# Patient Record
Sex: FEMALE | Race: WHITE | Employment: UNEMPLOYED | URBAN - METROPOLITAN AREA
[De-identification: names, ages, dates, MRNs, and addresses within clinical notes are randomized per-mention and may not be internally consistent; named-entity substitution may affect disease eponyms.]

---

## 2018-12-18 ENCOUNTER — HOSPITAL ENCOUNTER (OUTPATIENT)
Dept: PHYSICAL THERAPY | Age: 38
Discharge: HOME | End: 2018-12-18
Payer: COMMERCIAL

## 2018-12-18 PROCEDURE — 97161 PT EVAL LOW COMPLEX 20 MIN: CPT

## 2018-12-18 NOTE — THERAPY EVALUATION
Elba Leo  : 1980  Primary: Perry Gallegos [facility]  Secondary:  [address] at [facility] ROXANNE FRIED  1101 [facility], [place], [address] [place] [address],[address], [facility] [address].  [phone]   [phone]          OUTPATIENT PHYSICAL THERAPY:Initial Assessment 2018   ICD-10: Treatment Diagnosis: Pain in left knee (M25.562)    Precautions/Allergies: allergic to PCN and sulfa      MD Orders: Eval and treat, ROM, strengthening, HEP 2x week for 6 weeks. MEDICAL/REFERRING DIAGNOSIS:  Left Knee    DATE OF ONSET: about 2 years ago  REFERRING PHYSICIAN: Yandel Cody MD  RETURN PHYSICIAN APPOINTMENT: 19     INITIAL ASSESSMENT:  Ms. Duncan Thornton is a 43year old female with complaints of L knee pain over the past couple of years. She presents with pain during some activities, decreased flexibility in quads and hip flexors, and radiographic evidence of arthritis in her L knee. She could benefit from PT to address deficits and work toward goals. PROBLEM LIST (Impacting functional limitations):  1. Decreased ADL/Functional Activities  2. Increased Pain  3. Decreased Flexibility/Joint Mobility INTERVENTIONS PLANNED: (Treatment may consist of any combination of the following)  1. Home Exercise Program (HEP)  2. Manual Therapy  3. Therapeutic Exercise/Strengthening  4. aquatic PT   5. Modals as needed   TREATMENT PLAN:  Effective Dates: 2018 TO 3/18/2019 (90 days). Frequency/Duration: 2 times a week for 90 Day(s)  GOALS: (Goals have been discussed and agreed upon with patient.)  Patient's stated goal is to be able to exercise again  Short-Term Functional Goals: Time Frame: 6 weeks  1. Patient to dinesh independent with HEP  2. Patient to tolerate 45 minute aquatic PT sessions  3. Patient to report no pain in L knee greater than 6/10 for 3 days in a row. Discharge Goals: Time Frame: 90 days  1. Patient to report no more than minimal L knee pain with usual daily activities  2.  Patient to report being able to resume walking for exercising  3. Patient to improve LEFS score to 60 to demo improved functional mobility  Rehabilitation Potential For Stated Goals: [facility], I certify that the treatment plan above will be carried out by a therapist or under their direction. Thank you for this referral,  Arturo Dominguez PT                  The information in this section was collected on 12/18/18 (except where otherwise noted). HISTORY:   History of Present Injury/Illness (Reason for Referral):  *patient reports that about 2 years ago, she hyper extended her L knee while putting a child in his car seat. She rested and iced off and on and the knee seemed to get better at times, but would get worse again. She finally went to family MD and was referred to Dr. Neha Medrano. An MRI showed \"An [de-identified]year old knee\" and she was told that surgery was not an option. She reports that the knee often gómez underneath her. Past Medical History/Comorbidities: Concussion, stress fracture, ulcerative colitis. Social History/Living Environment:      Lives in 2 [place] home with family. Her daughter shadows Dr. Neha Medrano. Prior Level of Function/Work/Activity:  Middle . Likes to exercise to lose weight but knee pain prevents it. Ambulatory/Rehab Services H2 Model Falls Risk Assessment    Risk Factors:       No Risk Factors Identified Ability to Rise from Chair:       (0)  Ability to rise in a single movement    Falls Prevention Plan:       No modifications necessary   Total: (5 or greater = High Risk): 0    ©2010 [facility] [employer]. All Rights Reserved. [facility] States Patent #[id number].  Federal Law prohibits the replication, distribution or use without written permission from [employer]     Current Medications:  Lialda, Omega 3, probiotic   Date Last Reviewed:  12/18/18   Number of Personal Factors/Comorbidities that affect the Plan of Care: 0: LOW COMPLEXITY   EXAMINATION: Observation/Orthostatic Postural Assessment:          Moderately overweight female in no acute distress. B genuvalgum. Girth measurements at superior an inferior patellar poles R 43.5/35 cm,  L 41.5/33.5 cm  Palpation:          No pain with palpation  ROM:          AROM R knee flex 130, extn 1        AROM L knee flex 128, extn 3        Tight in B hip flexors and quads  Strength:          Grossly 5/5 in B LEs via MMT,   Special Tests:          Positive McMurrays on L LE. Ambiguous grind test on B LEs  Neurological Screen:        Sensation: light touch intact in B LEs   Body Structures Involved:  1. Joints  2. Muscles Body Functions Affected:  1. Neuromusculoskeletal  2. Movement Related Activities and Participation Affected:  1. General Tasks and Demands  2. Mobility  3. Community, Social and Heth [place]   Number of elements (examined above) that affect the Plan of Care: 1-2: LOW COMPLEXITY   CLINICAL PRESENTATION:   Presentation: Evolving clinical presentation with changing clinical characteristics: MODERATE COMPLEXITY   CLINICAL DECISION MAKING:   Outcome Measure: Tool Used: Lower Extremity Functional Scale (LEFS)  Score:  Initial: 44/80 Most Recent: X/80 (Date: -- )   Interpretation of Score: 20 questions each scored on a 5 point scale with 0 representing \"extreme difficulty or unable to perform\" and 4 representing \"no difficulty\". The lower the score, the greater the functional disability. 80/80 represents no disability. Minimal detectable change is 9 points. Medical Necessity:   · Patient demonstrates fair rehab potential due to higher previous functional level. Reason for Services/Other Comments:  · Patient continues to require skilled intervention due to need to return to higher level of function.    Use of outcome tool(s) and clinical judgement create a POC that gives a: Questionable prediction of patient's progress: MODERATE COMPLEXITY            TREATMENT:   (In addition to Assessment/Re-Assessment sessions the following treatments were rendered)  Pre-treatment Symptoms/Complaints:  Pain with some movements, knee locking up or buckling underneath her. Pain: Initial:   Pain Intensity 1: 0(None sitting, but up to 8 at times)   Post Session:  Slightly increased pain reported, but not quantified. Therapeutic Exercise: ( ):  5 min - Exercises to improve mobility and strength. Required moderate visual and verbal cues to ensure correct performance. Progressed complexity of movement as indicated. Instructed patient in hip flexor and quad stretch from Olive Hippo test position. Showed her the pool and lock facilities. Treatment/Session Assessment:    · Response to Treatment:  Patient seemed to understand importance of stretching. Will plan to start in pool and progress to land exercises as able. · Compliance with Program/Exercises: Will assess as treatment progresses. · Recommendations/Intent for next treatment session: \"Next visit will focus on beginning aquatic PT. \".   Total Treatment Duration:  50 minutes  PT Patient Time In/Time Out  Time In: 1630  Time Out: [address] [facility] Kavon Torres

## 2018-12-20 ENCOUNTER — HOSPITAL ENCOUNTER (OUTPATIENT)
Dept: PHYSICAL THERAPY | Age: 38
Discharge: HOME | End: 2018-12-20
Payer: COMMERCIAL

## 2018-12-20 PROCEDURE — 97113 AQUATIC THERAPY/EXERCISES: CPT

## 2018-12-20 NOTE — PROGRESS NOTES
Abeba Leo  : 1980  Primary: Roxanne Ramos  Secondary:  [address]  at [facility] ROXANNE FRIED  1101 [facility], [address] [place] [address] [address], [facility] [address]. 91.  [phone]   [phone]          OUTPATIENT PHYSICAL THERAPY:Daily Note and Aquatic Note 2018   ICD-10: Treatment Diagnosis: Pain in left knee (M25.562)    Precautions/Allergies: allergic to PCN and sulfa      MD Orders: Eval and treat, ROM, strengthening, HEP 2x week for 6 weeks. MEDICAL/REFERRING DIAGNOSIS:  L knee    DATE OF ONSET: about 2 years ago  REFERRING PHYSICIAN: Elsa Lockwood MD  RETURN PHYSICIAN APPOINTMENT: 19     INITIAL ASSESSMENT:  Ms. Esperanza Vazquez is a 43year old female with complaints of L knee pain over the past couple of years. She presents with pain during some activities, decreased flexibility in quads and hip flexors, and radiographic evidence of arthritis in her L knee. She could benefit from PT to address deficits and work toward goals. PROBLEM LIST (Impacting functional limitations):  1. Decreased ADL/Functional Activities  2. Increased Pain  3. Decreased Flexibility/Joint Mobility INTERVENTIONS PLANNED: (Treatment may consist of any combination of the following)  1. Home Exercise Program (HEP)  2. Manual Therapy  3. Therapeutic Exercise/Strengthening  4. aquatic PT   5. Modals as needed   TREATMENT PLAN:  Effective Dates: 2018 TO 3/18/2019 (90 days). Frequency/Duration: 2 times a week for 90 Day(s)  GOALS: (Goals have been discussed and agreed upon with patient.)  Patient's stated goal is to be able to exercise again  Short-Term Functional Goals: Time Frame: 6 weeks  1. Patient to dinesh independent with HEP  2. Patient to tolerate 45 minute aquatic PT sessions  3. Patient to report no pain in L knee greater than 6/10 for 3 days in a row. Discharge Goals: Time Frame: 90 days  1. Patient to report no more than minimal L knee pain with usual daily activities  2.  Patient to report being able to resume walking for exercising  3. Patient to improve LEFS score to 60 to demo improved functional mobility  Rehabilitation Potential For Stated Goals: Fair                    The information in this section was collected on 12/18/18 (except where otherwise noted). HISTORY:   History of Present Injury/Illness (Reason for Referral):  *patient reports that about 2 years ago, she hyper extended her L knee while putting a child in his car seat. She rested and iced off and on and the knee seemed to get better at times, but would get worse again. She finally went to family MD and was referred to Dr. Saqib Parker. An MRI showed \"An [de-identified]year old knee\" and she was told that surgery was not an option. She reports that the knee often gómez underneath her. Past Medical History/Comorbidities: Concussion, stress fracture, ulcerative colitis. Social History/Living Environment:      Lives in 2 story home with family. Her daughter shadows Dr. Saqib Parker. Prior Level of Function/Work/Activity:  Middle . Likes to exercise to lose weight but knee pain prevents it. Ambulatory/Rehab Services H2 Model Falls Risk Assessment    Risk Factors:       No Risk Factors Identified Ability to Rise from Chair:       (0)  Ability to rise in a single movement    Falls Prevention Plan:       No modifications necessary   Total: (5 or greater = High Risk): 0    ©2010 [facility] [employer]. All Rights Reserved. [facility] States Patent #[id number]. Federal Law prohibits the replication, distribution or use without written permission from [facility] [employer]     Current Medications:  Lialda, Omega 3, probiotic   Date Last Reviewed:  12/18/18   Number of Personal Factors/Comorbidities that affect the Plan of Care: 0: LOW COMPLEXITY   EXAMINATION:   Observation/Orthostatic Postural Assessment:          Moderately overweight female in no acute distress. B genuvalgum.   Girth measurements at superior an inferior patellar poles R 43.5/35 cm,  L 41.5/33.5 cm  Palpation:          No pain with palpation  ROM:          AROM R knee flex 130, extn 1        AROM L knee flex 128, extn 3        Tight in B hip flexors and quads  Strength:          Grossly 5/5 in B LEs via MMT,   Special Tests:          Positive McMurrays on L LE. Ambiguous grind test on B LEs  Neurological Screen:        Sensation: light touch intact in B LEs   Body Structures Involved:  1. Joints  2. Muscles Body Functions Affected:  1. Neuromusculoskeletal  2. Movement Related Activities and Participation Affected:  1. General Tasks and Demands  2. Mobility  3. Community, Social and Kevin Trade   Number of elements (examined above) that affect the Plan of Care: 1-2: LOW COMPLEXITY   CLINICAL PRESENTATION:   Presentation: Evolving clinical presentation with changing clinical characteristics: MODERATE COMPLEXITY   CLINICAL DECISION MAKING:   Outcome Measure: Tool Used: Lower Extremity Functional Scale (LEFS)  Score:  Initial: 44/80 Most Recent: X/80 (Date: -- )   Interpretation of Score: 20 questions each scored on a 5 point scale with 0 representing \"extreme difficulty or unable to perform\" and 4 representing \"no difficulty\". The lower the score, the greater the functional disability. 80/80 represents no disability. Minimal detectable change is 9 points. Medical Necessity:   · Patient demonstrates fair rehab potential due to higher previous functional level. Reason for Services/Other Comments:  · Patient continues to require skilled intervention due to need to return to higher level of function. Use of outcome tool(s) and clinical judgement create a POC that gives a: Questionable prediction of patient's progress: MODERATE COMPLEXITY            TREATMENT:   (In addition to Assessment/Re-Assessment sessions the following treatments were rendered)  Pre-treatment Symptoms/Complaints:  Pt with some pain in her left knee with movement.     Pain: Initial:       Post Session:  Not rated Aquatic Therapy (45 minutes): Aquatic treatment performed per flow grid for Decreased muscle strength, Decreased range of motion, Decreased activity endurance, Decompression and Ease of movement. Cues provided for posture. Aquatic Exercise Log       Date  12/20/18 Date   Date   Date   Date     Activity/ Exercise Parameters Parameters Parameters Parameters Parameters   Walking forward 6       Walking backward 6       Walking sideways 6         Marching 6         Goose Step 6         Tip toes 3         Heels 3         Lunges Long step 6       Side step squats        LE Exercises 4.5' no resistance         Hip Flex/Ext 10         Hip Abd/Add 10         Hip IR/ER          Calf raises 10         Knee Flex          Squats          Leg Circles 10/10         Step Ups 10 B       UE Exercises          Squeeze In          Push Down          Pull Down          Bicep/Tricep        Rows/Press outs         Chi Positions          Trunk Rotation        Deep H2O/ Noodles 7' no resistance blue rings         Stabilization          Arms only          Legs only Bicycle 3 min       Cross   Country 2 min         Scissors 2 min         Crab walk        Lower abdominal   work  Padinmotion & Kareo 2 min         Cardio          Jogging        Lap   Swimming          Stretches          Hamstrings          Heelcords          Piriformis          Neck            Therapeutic Exercise: ( ):  min - Exercises to improve mobility and strength. Required moderate visual and verbal cues to ensure correct performance. Progressed complexity of movement as indicated. Treatment/Session Assessment:    · Response to Treatment:  Pt tolerated aquatic therapy with only minimal complaints of pain in left knee. · Compliance with Program/Exercises: Will assess as treatment progresses. · Recommendations/Intent for next treatment session: \"Next visit will focus on beginning aquatic PT. \".   Total Treatment Duration:  45 minutes  PT Patient Time In/Time Out  Time In: 1130  Time Out: CONSUELO Reddy

## 2018-12-26 ENCOUNTER — HOSPITAL ENCOUNTER (OUTPATIENT)
Dept: PHYSICAL THERAPY | Age: 38
Discharge: HOME | End: 2018-12-26
Payer: COMMERCIAL

## 2018-12-26 PROCEDURE — 97113 AQUATIC THERAPY/EXERCISES: CPT

## 2018-12-26 NOTE — PROGRESS NOTES
Abeba Leo  : 1980  Primary: Dario Hong [facility]  Secondary:  [address]  at [facility] ROXANNE FRIED  1101 [facility], [address] [place] [address],[address], [facility] [address].  [phone]   [phone]          OUTPATIENT PHYSICAL THERAPY:Daily Note and Aquatic Note 2018   ICD-10: Treatment Diagnosis: Pain in left knee (M25.562)    Precautions/Allergies: allergic to PCN and sulfa      MD Orders: Eval and treat, ROM, strengthening, HEP 2x week for 6 weeks. MEDICAL/REFERRING DIAGNOSIS:  L knee    DATE OF ONSET: about 2 years ago  REFERRING PHYSICIAN: Jeremy Arroyo MD  RETURN PHYSICIAN APPOINTMENT: 19     INITIAL ASSESSMENT:  Ms. Dennis Florez is a 43year old female with complaints of L knee pain over the past couple of years. She presents with pain during some activities, decreased flexibility in quads and hip flexors, and radiographic evidence of arthritis in her L knee. She could benefit from PT to address deficits and work toward goals. PROBLEM LIST (Impacting functional limitations):  1. Decreased ADL/Functional Activities  2. Increased Pain  3. Decreased Flexibility/Joint Mobility INTERVENTIONS PLANNED: (Treatment may consist of any combination of the following)  1. Home Exercise Program (HEP)  2. Manual Therapy  3. Therapeutic Exercise/Strengthening  4. aquatic PT   5. Modals as needed   TREATMENT PLAN:  Effective Dates: 2018 TO 3/18/2019 (90 days). Frequency/Duration: 2 times a week for 90 Day(s)  GOALS: (Goals have been discussed and agreed upon with patient.)  Patient's stated goal is to be able to exercise again  Short-Term Functional Goals: Time Frame: 6 weeks  1. Patient to dinesh independent with HEP  2. Patient to tolerate 45 minute aquatic PT sessions  3. Patient to report no pain in L knee greater than 6/10 for 3 days in a row. Discharge Goals: Time Frame: 90 days  1. Patient to report no more than minimal L knee pain with usual daily activities  2.  Patient to report being able to resume walking for exercising  3. Patient to improve LEFS score to 60 to demo improved functional mobility  Rehabilitation Potential For Stated Goals: Fair                    The information in this section was collected on 12/18/18 (except where otherwise noted). HISTORY:   History of Present Injury/Illness (Reason for Referral):  *patient reports that about 2 years ago, she hyper extended her L knee while putting a child in his car seat. She rested and iced off and on and the knee seemed to get better at times, but would get worse again. She finally went to family MD and was referred to Dr. Adonis Nash. An MRI showed \"An [de-identified]year old knee\" and she was told that surgery was not an option. She reports that the knee often gómez underneath her. Past Medical History/Comorbidities: Concussion, stress fracture, ulcerative colitis. Social History/Living Environment:      Lives in 2 story home with family. Her daughter shadows Dr. Adonis Nash. Prior Level of Function/Work/Activity:  Middle . Likes to exercise to lose weight but knee pain prevents it. Ambulatory/Rehab Services H2 Model Falls Risk Assessment    Risk Factors:       No Risk Factors Identified Ability to Rise from Chair:       (0)  Ability to rise in a single movement    Falls Prevention Plan:       No modifications necessary   Total: (5 or greater = High Risk): 0    ©2010 [facility] [employer]. All Rights Reserved. [facility] Patent #[id number]. Federal Law prohibits the replication, distribution or use without written permission from [facility] [employer]     Current Medications:  Lialda, Omega 3, probiotic   Date Last Reviewed:  12/18/18   Number of Personal Factors/Comorbidities that affect the Plan of Care: 0: LOW COMPLEXITY   EXAMINATION:   Observation/Orthostatic Postural Assessment:          Moderately overweight female in no acute distress. B genuvalgum.   Girth measurements at superior an inferior patellar poles R 43.5/35 cm,  L 41.5/33.5 cm  Palpation:          No pain with palpation  ROM:          AROM R knee flex 130, extn 1        AROM L knee flex 128, extn 3        Tight in B hip flexors and quads  Strength:          Grossly 5/5 in B LEs via MMT,   Special Tests:          Positive McMurrays on L LE. Ambiguous grind test on B LEs  Neurological Screen:        Sensation: light touch intact in B LEs   Body Structures Involved:  1. Joints  2. Muscles Body Functions Affected:  1. Neuromusculoskeletal  2. Movement Related Activities and Participation Affected:  1. General Tasks and Demands  2. Mobility  3. Community, Social and Pierce Kelayres   Number of elements (examined above) that affect the Plan of Care: 1-2: LOW COMPLEXITY   CLINICAL PRESENTATION:   Presentation: Evolving clinical presentation with changing clinical characteristics: MODERATE COMPLEXITY   CLINICAL DECISION MAKING:   Outcome Measure: Tool Used: Lower Extremity Functional Scale (LEFS)  Score:  Initial: 44/80 Most Recent: X/80 (Date: -- )   Interpretation of Score: 20 questions each scored on a 5 point scale with 0 representing \"extreme difficulty or unable to perform\" and 4 representing \"no difficulty\". The lower the score, the greater the functional disability. 80/80 represents no disability. Minimal detectable change is 9 points. Medical Necessity:   · Patient demonstrates fair rehab potential due to higher previous functional level. Reason for Services/Other Comments:  · Patient continues to require skilled intervention due to need to return to higher level of function. Use of outcome tool(s) and clinical judgement create a POC that gives a: Questionable prediction of patient's progress: MODERATE COMPLEXITY            TREATMENT:   (In addition to Assessment/Re-Assessment sessions the following treatments were rendered)  Pre-treatment Symptoms/Complaints:  Pt states she is doing well- less pain but still some pain with movement.   Pain: Initial:       Post Session:  Not rated    Aquatic Therapy (45 minutes): Aquatic treatment performed per flow grid for Decreased muscle strength, Decreased range of motion, Decreased activity endurance, Decompression and Ease of movement. Cues provided for posture. Aquatic Exercise Log       Date  12/20/18 Date  12/26   Date   Date   Date     Activity/ Exercise Parameters Parameters Parameters Parameters Parameters   Walking forward 6 6      Walking backward 6 6      Walking sideways 6 6        Marching 6 6        Goose Step 6 6        Tip toes 3 3        Heels 3 3        Lunges Long step 6 Long step 6      Side step squats        LE Exercises 4.5' no resistance 4.5 4#        Hip Flex/Ext 10 10        Hip Abd/Add 10 10        Hip IR/ER          Calf raises 10 10        Knee Flex          Squats          Leg Circles 10/10 10/10        Step Ups 10 B 10      UE Exercises          Squeeze In          Push Down          Pull Down          Bicep/Tricep        Rows/Press outs         Chi Positions          Trunk Rotation        Deep H2O/ Noodles 7' no resistance blue rings 7' with 4#        Stabilization          Arms only          Legs only Bicycle 3 min 2 min      Cross   Country 2 min 2 min        Scissors 2 min 2 min        Crab walk        Lower abdominal   work  DKTC 2 min DKTC 2 min        Cardio          Jogging        Lap   Swimming          Stretches          Hamstrings          Heelcords          Piriformis          Neck            Therapeutic Exercise: ( ):  min - Exercises to improve mobility and strength. Required moderate visual and verbal cues to ensure correct performance. Progressed complexity of movement as indicated. Treatment/Session Assessment:    · Response to Treatment:  Pt did well with aquatics and did not have pain in the water. She states the swelling is better too  · Compliance with Program/Exercises: Will assess as treatment progresses. · Recommendations/Intent for next treatment session:  \"Next visit will focus on  aquatic PT. \".   Total Treatment Duration:  45 minutes  PT Patient Time In/Time Out  Time In: 0930  Time Out: 1015    Milena Zapata PT

## 2018-12-28 ENCOUNTER — HOSPITAL ENCOUNTER (OUTPATIENT)
Dept: PHYSICAL THERAPY | Age: 38
Discharge: HOME | End: 2018-12-28
Payer: COMMERCIAL

## 2018-12-28 PROCEDURE — 97110 THERAPEUTIC EXERCISES: CPT

## 2018-12-28 NOTE — PROGRESS NOTES
[address] : 1980 Primary: [address] Secondary:  Therapy Center at [facility] [address] [facility], [address] [place] [address],[address], [place] [address]. [phone]   [phone] OUTPATIENT PHYSICAL THERAPY:Daily Note 2018 ICD-10: Treatment Diagnosis: Pain in left knee (M25.562) Precautions/Allergies: allergic to PCN and sulfa MD Orders: Eval and treat, ROM, strengthening, HEP 2x week for 6 weeks. MEDICAL/REFERRING DIAGNOSIS: 
Left Knee DATE OF ONSET: about 2 years ago REFERRING PHYSICIAN: John Fish MD 
RETURN PHYSICIAN APPOINTMENT: 19 INITIAL ASSESSMENT:  Ms. Vicky Leung is a 43year old female with complaints of L knee pain over the past couple of years. She presents with pain during some activities, decreased flexibility in quads and hip flexors, and radiographic evidence of arthritis in her L knee. She could benefit from PT to address deficits and work toward goals. PROBLEM LIST (Impacting functional limitations): 1. Decreased ADL/Functional Activities 2. Increased Pain 3. Decreased Flexibility/Joint Mobility INTERVENTIONS PLANNED: (Treatment may consist of any combination of the following) 1. Home Exercise Program (HEP) 2. Manual Therapy 3. Therapeutic Exercise/Strengthening 4. aquatic PT  
5. Modals as needed TREATMENT PLAN: 
Effective Dates: 2018 TO 3/18/2019 (90 days). Frequency/Duration: 2 times a week for 90 Day(s) GOALS: (Goals have been discussed and agreed upon with patient.) Patient's stated goal is to be able to exercise again Short-Term Functional Goals: Time Frame: 6 weeks 1. Patient to dinesh independent with HEP 2. Patient to tolerate 45 minute aquatic PT sessions 3. Patient to report no pain in L knee greater than 6/10 for 3 days in a row. Discharge Goals: Time Frame: 90 days 1. Patient to report no more than minimal L knee pain with usual daily activities 2. Patient to report being able to resume walking for exercising 3. Patient to improve LEFS score to 60 to demo improved functional mobility Rehabilitation Potential For Stated Goals: Fair The information in this section was collected on 12/18/18 (except where otherwise noted). HISTORY:  
History of Present Injury/Illness (Reason for Referral): 
*patient reports that about 2 years ago, she hyper extended her L knee while putting a child in his car seat. She rested and iced off and on and the knee seemed to get better at times, but would get worse again. She finally went to family MD and was referred to Dr. Sal Santos. An MRI showed \"An [de-identified]year old knee\" and she was told that surgery was not an option. She reports that the knee often gómez underneath her. Past Medical History/Comorbidities: Concussion, stress fracture, ulcerative colitis. Social History/Living Environment:  
   Lives in 2 story home with family. Her daughter shadows Dr. Sal Santos. Prior Level of Function/Work/Activity: 
Middle . Likes to exercise to lose weight but knee pain prevents it. Total: (5 or greater = High Risk): 0  
 ©2010 [facility] . [facility] States Patent #[id number]. Federal Law prohibits the replication, distribution or use without written permission from [facility] Current Medications:  Lialda, Omega 3, probiotic   Date Last Reviewed:  12/28/18 EXAMINATION:  
Observation/Orthostatic Postural Assessment:   
      Moderately overweight female in no acute distress. B genuvalgum. Girth measurements at superior an inferior patellar poles R 43.5/35 cm,  L 41.5/33.5 cm Palpation: No pain with palpation ROM:   
      AROM R knee flex 130, extn 1 
      AROM L knee flex 128, extn 3 Tight in B hip flexors and quads Strength:   
      Grossly 5/5 in B LEs via MMT, Special Tests: Positive McMurrays on L LE. Ambiguous grind test on B LEs Neurological Screen: 
      Sensation: light touch intact in B LEs Body Structures Involved: 1. Joints 2. Muscles Body Functions Affected: 1. Neuromusculoskeletal 
2. Movement Related Activities and Participation Affected: 1. General Tasks and Demands 2. Mobility 3. Community, Social and [place] CLINICAL DECISION MAKING:  
Outcome Measure: Tool Used: Lower Extremity Functional Scale (LEFS) Score:  Initial: 44/80 Most Recent: X/80 (Date: -- ) Interpretation of Score: 20 questions each scored on a 5 point scale with 0 representing \"extreme difficulty or unable to perform\" and 4 representing \"no difficulty\". The lower the score, the greater the functional disability. 80/80 represents no disability. Minimal detectable change is 9 points. Medical Necessity:  
· Patient demonstrates fair rehab potential due to higher previous functional level. Reason for Services/Other Comments: 
· Patient continues to require skilled intervention due to need to return to higher level of function. TREATMENT:  
(In addition to Assessment/Re-Assessment sessions the following treatments were rendered) Pre-treatment Symptoms/Complaints:  Pain reports she is doing a little better. Able to go up stairs reciprocally for the first time in a while. Doing HEP. Doesn't feel a need to review her HEP. Pain: Initial:  
Pain Intensity 1: 0   Post Session:  Increased pain during step ups, but not during other exercises. Therapeutic Exercise: (40 Minutes):  Exercises to improve mobility and strength. Required moderate visual and verbal cues to ensure correct performance. Progressed complexity of movement as indicated. Date: 
12/28/18 Date: 
 Date: Activity/Exercise Parameters Parameters Parameters SLR in 4 planes  2# B 2x10 Shuttle B leg press 50# 2x15 Shuttle unilateral leg press 25# B 2x15 Hamstring curl machine 15# B 2x15 Knee extn machine - unilateral 10# B 2x15 Forward step ups 4\" B 1x15 Partial wall slides 10 sec x 10 HEP - add SLR in 4 planes, partial wall slides, and TKE with band. Green band issued for HEP use. Treatment/Session Assessment:   
· Response to Treatment:  Patient only had pain during forward step ups, not the other exercises. She feels like her knee is getting a little better. · Compliance with Program/Exercises: yes, so far. · Recommendations/Intent for next treatment session: \"Next visit will focus on progression of exercises\". Total Treatment Duration:  40 minutes PT Patient Time In/Time Out Time In: 1127 Time Out: 5805 Elvia Koroma PT

## 2019-01-03 ENCOUNTER — HOSPITAL ENCOUNTER (OUTPATIENT)
Dept: PHYSICAL THERAPY | Age: 39
Discharge: HOME | End: 2019-01-03
Payer: COMMERCIAL

## 2019-01-03 PROCEDURE — 97113 AQUATIC THERAPY/EXERCISES: CPT

## 2019-01-03 NOTE — PROGRESS NOTES
[address] : 1980 Primary: [address] Secondary:  Therapy Center at [facility] Z[facility]UET[facility] [address] [facility], [address] [place] [address],[address], [place] [address]. [phone]   [phone] OUTPATIENT PHYSICAL THERAPY:Daily Note and Aquatic Note 1/3/2019 ICD-10: Treatment Diagnosis: Pain in left knee (M25.562) Precautions/Allergies: allergic to PCN and sulfa MD Orders: Eval and treat, ROM, strengthening, HEP 2x week for 6 weeks. MEDICAL/REFERRING DIAGNOSIS: 
L knee DATE OF ONSET: about 2 years ago REFERRING PHYSICIAN: Hakeem Terrazas MD 
RETURN PHYSICIAN APPOINTMENT: 19 INITIAL ASSESSMENT:  Ms. Naila Alford is a 43year old female with complaints of L knee pain over the past couple of years. She presents with pain during some activities, decreased flexibility in quads and hip flexors, and radiographic evidence of arthritis in her L knee. She could benefit from PT to address deficits and work toward goals. PROBLEM LIST (Impacting functional limitations): 1. Decreased ADL/Functional Activities 2. Increased Pain 3. Decreased Flexibility/Joint Mobility INTERVENTIONS PLANNED: (Treatment may consist of any combination of the following) 1. Home Exercise Program (HEP) 2. Manual Therapy 3. Therapeutic Exercise/Strengthening 4. aquatic PT  
5. Modals as needed TREATMENT PLAN: 
Effective Dates: 2018 TO 3/18/2019 (90 days). Frequency/Duration: 2 times a week for 90 Day(s) GOALS: (Goals have been discussed and agreed upon with patient.) Patient's stated goal is to be able to exercise again Short-Term Functional Goals: Time Frame: 6 weeks 1. Patient to dinesh independent with HEP 2. Patient to tolerate 45 minute aquatic PT sessions 3. Patient to report no pain in L knee greater than 6/10 for 3 days in a row. Discharge Goals: Time Frame: 90 days 1. Patient to report no more than minimal L knee pain with usual daily activities 2. Patient to report being able to resume walking for exercising 3. Patient to improve LEFS score to 60 to demo improved functional mobility Rehabilitation Potential For Stated Goals: Fair The information in this section was collected on 12/18/18 (except where otherwise noted). HISTORY:  
History of Present Injury/Illness (Reason for Referral): 
*patient reports that about 2 years ago, she hyper extended her L knee while putting a child in his car seat. She rested and iced off and on and the knee seemed to get better at times, but would get worse again. She finally went to family MD and was referred to Dr. Priya Holley. An MRI showed \"An [de-identified]year old knee\" and she was told that surgery was not an option. She reports that the knee often gómez underneath her. Past Medical History/Comorbidities: Concussion, stress fracture, ulcerative colitis. Social History/Living Environment:  
   Lives in 2 [place] home with family. Her daughter shadows Dr. Priya Holley. Prior Level of Function/Work/Activity: 
Middle . Likes to exercise to lose weight but knee pain prevents it. Ambulatory/Rehab Services H2 Model Falls Risk Assessment Risk Factors: 
     No Risk Factors Identified Ability to Rise from Chair: 
     (0)  Ability to rise in a single movement Falls Prevention Plan: No modifications necessary Total: (5 or greater = High Risk): 0  
 ©2010 [facility] [address] [place] [address] States Patent #[id number]. Federal Law prohibits the replication, distribution or use without written permission from [facility] [employer] Current Medications:  Lialda, Omega 3, probiotic   Date Last Reviewed:  12/18/18 Number of Personal Factors/Comorbidities that affect the Plan of Care: 0: LOW COMPLEXITY EXAMINATION:  
Observation/Orthostatic Postural Assessment:   
      Moderately overweight female in no acute distress. B genuvalgum. Girth measurements at superior an inferior patellar poles R 43.5/35 cm,  L 41.5/33.5 cm Palpation: No pain with palpation ROM:   
      AROM R knee flex 130, extn 1 
      AROM L knee flex 128, extn 3 Tight in B hip flexors and quads Strength:   
      Grossly 5/5 in B LEs via MMT, Special Tests:   
      Positive McMurrays on L LE. Ambiguous grind test on B LEs Neurological Screen: 
      Sensation: light touch intact in B LEs Body Structures Involved: 1. Joints 2. Muscles Body Functions Affected: 1. Neuromusculoskeletal 
2. Movement Related Activities and Participation Affected: 1. General Tasks and Demands 2. Mobility 3. Community, Social and [place] Number of elements (examined above) that affect the Plan of Care: 1-2: LOW COMPLEXITY CLINICAL PRESENTATION:  
Presentation: Evolving clinical presentation with changing clinical characteristics: MODERATE COMPLEXITY CLINICAL DECISION MAKING:  
Outcome Measure: Tool Used: Lower Extremity Functional Scale (LEFS) Score:  Initial: 44/80 Most Recent: X/80 (Date: -- ) Interpretation of Score: 20 questions each scored on a 5 point scale with 0 representing \"extreme difficulty or unable to perform\" and 4 representing \"no difficulty\". The lower the score, the greater the functional disability. 80/80 represents no disability. Minimal detectable change is 9 points. Medical Necessity:  
· Patient demonstrates fair rehab potential due to higher previous functional level. Reason for Services/Other Comments: 
· Patient continues to require skilled intervention due to need to return to higher level of function. Use of outcome tool(s) and clinical judgement create a POC that gives a: Questionable prediction of patient's progress: MODERATE COMPLEXITY  
  
 
 
 
TREATMENT:  
(In addition to Assessment/Re-Assessment sessions the following treatments were rendered) Pre-treatment Symptoms/Complaints:  Pt states she is doing well- less pain but still some pain with movement. Pain: Initial:  
    Post Session:  Not rated Aquatic Therapy (45 minutes): Aquatic treatment performed per flow grid for Decreased muscle strength, Decreased range of motion, Decreased activity endurance, Decompression and Ease of movement. Cues provided for posture. Aquatic Exercise Log Date 
12/20/18 Date 
12/26 Date 1/3/19 Date Date Activity/ Exercise Parameters Parameters Parameters Parameters Parameters Walking forward 6 6 6 Walking backward 6 6 6 Walking sideways 6 6 6 Marching 6 6 6 Goose Step 6 6 6 Tip toes 3 3 3 Heels 3 3 3 Lunges Long step 6 Long step 6 Long step 6 Side step squats LE Exercises 4.5' no resistance 4.5 4# 3. 5' with purple noodle Hip Flex/Ext 10 10 15 Hip Abd/Add 10 10 15 Hip IR/ER Calf raises 10 10 15 Knee Flex   15 Squats Leg Circles 10/10 10/10 15/15 Step Ups 10 B 10 15    
UE Exercises Squeeze In Push Down Pull Down Bicep/Tricep Rows/Press outs  Chi Positions Trunk Rotation Deep H2O/ Noodles 7' no resistance blue rings 7' with 4# 7' with no resistance Stabilization Arms only Legs only Bicycle 3 min 2 min 3 min [employer] 2 min 2 min 3 min Scissors 2 min 2 min 3 min Crab walk Lower abdominal  
work  DKTC 2 min DKTC 2 min DKTC 3 min Cardio Jogging Lap Swimming Stretches Hamstrings Heelcords Piriformis Neck Therapeutic Exercise: ( ):  min - Exercises to improve mobility and strength. Required moderate visual and verbal cues to ensure correct performance. Progressed complexity of movement as indicated. Treatment/Session Assessment: · Response to Treatment:  Pt states she is having a little more pain today due to walking at [facility] yesterday. · Compliance with Program/Exercises: Will assess as treatment progresses. · Recommendations/Intent for next treatment session: \"Next visit will focus on  aquatic PT. \". Total Treatment Duration:  60 minutes PT Patient Time In/Time Out Time In: 1030 Time Out: 1130 Mamta Crump, PTA

## 2019-01-07 ENCOUNTER — HOSPITAL ENCOUNTER (OUTPATIENT)
Dept: PHYSICAL THERAPY | Age: 39
Discharge: HOME | End: 2019-01-07
Payer: COMMERCIAL

## 2019-01-07 NOTE — PROGRESS NOTES
[address] : 1980 Primary: [address] Secondary:  Therapy Center at [facility] 11[address] [facility], [address] [place] [address],[address], [place] [address]. [phone]   [phone] OUTPATIENT PHYSICAL THERAPY:Cancellation 2019 ICD-10: Treatment Diagnosis: Pain in left knee (M25.562) Precautions/Allergies: allergic to PCN and sulfa MD Orders: Eval and treat, ROM, strengthening, HEP 2x week for 6 weeks. MEDICAL/REFERRING DIAGNOSIS: 
Left Knee DATE OF ONSET: about 2 years ago REFERRING PHYSICIAN: Mariluz Villanueva MD 
RETURN PHYSICIAN APPOINTMENT: 19 Patient called to cancel scheduled appointment.

## 2019-01-09 ENCOUNTER — APPOINTMENT (OUTPATIENT)
Dept: PHYSICAL THERAPY | Age: 39
End: 2019-01-09
Payer: COMMERCIAL

## 2019-01-16 ENCOUNTER — HOSPITAL ENCOUNTER (OUTPATIENT)
Dept: PHYSICAL THERAPY | Age: 39
Discharge: HOME | End: 2019-01-16
Payer: COMMERCIAL

## 2019-01-16 PROCEDURE — 97113 AQUATIC THERAPY/EXERCISES: CPT

## 2019-01-16 NOTE — PROGRESS NOTES
[address] : 1980 Primary: [address] Secondary:  Therapy Center at [facility] ROXANNE ZAZUETAA 1101 [facility], [address] [place] [address],[address], [place] [address]. [phone]   [phone] OUTPATIENT PHYSICAL THERAPY:Daily Note and Aquatic Note 2019 ICD-10: Treatment Diagnosis: Pain in left knee (M25.562) Precautions/Allergies: allergic to PCN and sulfa MD Orders: Eval and treat, ROM, strengthening, HEP 2x week for 6 weeks. MEDICAL/REFERRING DIAGNOSIS: 
L knee DATE OF ONSET: about 2 years ago REFERRING PHYSICIAN: Arlene Owens MD 
RETURN PHYSICIAN APPOINTMENT: 19 INITIAL ASSESSMENT:  Ms. Camilo Sims is a 43year old female with complaints of L knee pain over the past couple of years. She presents with pain during some activities, decreased flexibility in quads and hip flexors, and radiographic evidence of arthritis in her L knee. She could benefit from PT to address deficits and work toward goals. PROBLEM LIST (Impacting functional limitations): 1. Decreased ADL/Functional Activities 2. Increased Pain 3. Decreased Flexibility/Joint Mobility INTERVENTIONS PLANNED: (Treatment may consist of any combination of the following) 1. Home Exercise Program (HEP) 2. Manual Therapy 3. Therapeutic Exercise/Strengthening 4. aquatic PT  
5. Modals as needed TREATMENT PLAN: 
Effective Dates: 2018 TO 3/18/2019 (90 days). Frequency/Duration: 2 times a week for 90 Day(s) GOALS: (Goals have been discussed and agreed upon with patient.) Patient's stated goal is to be able to exercise again Short-Term Functional Goals: Time Frame: 6 weeks 1. Patient to dinesh independent with HEP 2. Patient to tolerate 45 minute aquatic PT sessions 3. Patient to report no pain in L knee greater than 6/10 for 3 days in a row. Discharge Goals: Time Frame: 90 days 1. Patient to report no more than minimal L knee pain with usual daily activities 2. Patient to report being able to resume walking for exercising 3. Patient to improve LEFS score to 60 to demo improved functional mobility Rehabilitation Potential For Stated Goals: Fair The information in this section was collected on 12/18/18 (except where otherwise noted). HISTORY:  
History of Present Injury/Illness (Reason for Referral): 
*patient reports that about 2 years ago, she hyper extended her L knee while putting a child in his car seat. She rested and iced off and on and the knee seemed to get better at times, but would get worse again. She finally went to family MD and was referred to Dr. Elida Saint. An MRI showed \"An [de-identified]year old knee\" and she was told that surgery was not an option. She reports that the knee often gómez underneath her. Past Medical History/Comorbidities: Concussion, stress fracture, ulcerative colitis. Social History/Living Environment:  
   Lives in 2 story home with family. Her daughter shadows Dr. Elida Saint. Prior Level of Function/Work/Activity: 
Middle . Likes to exercise to lose weight but knee pain prevents it. Ambulatory/Rehab Services H2 Model Falls Risk Assessment Risk Factors: 
     No Risk Factors Identified Ability to Rise from Chair: 
     (0)  Ability to rise in a single movement Falls Prevention Plan: No modifications necessary Total: (5 or greater = High Risk): 0  
 ©2010 [facility] [place] [address] [place] [address] States Patent #[id number]. Federal Law prohibits the replication, distribution or use without written permission from [facility] [employer] Current Medications:  Lialda, Omega 3, probiotic   Date Last Reviewed:  1/16/2019 Number of Personal Factors/Comorbidities that affect the Plan of Care: 0: LOW COMPLEXITY EXAMINATION:  
Observation/Orthostatic Postural Assessment:   
      Moderately overweight female in no acute distress. B genuvalgum. Girth measurements at superior an inferior patellar poles R 43.5/35 cm,  L 41.5/33.5 cm Palpation: No pain with palpation ROM:   
      AROM R knee flex 130, extn 1 
      AROM L knee flex 128, extn 3 Tight in B hip flexors and quads Strength:   
      Grossly 5/5 in B LEs via MMT, Special Tests:   
      Positive McMurrays on L LE. Ambiguous grind test on B LEs Neurological Screen: 
      Sensation: light touch intact in B LEs Body Structures Involved: 1. Joints 2. Muscles Body Functions Affected: 1. Neuromusculoskeletal 
2. Movement Related Activities and Participation Affected: 1. General Tasks and Demands 2. Mobility 3. Community, Social and [place] Number of elements (examined above) that affect the Plan of Care: 1-2: LOW COMPLEXITY CLINICAL PRESENTATION:  
Presentation: Evolving clinical presentation with changing clinical characteristics: MODERATE COMPLEXITY CLINICAL DECISION MAKING:  
Outcome Measure: Tool Used: Lower Extremity Functional Scale (LEFS) Score:  Initial: 44/80 Most Recent: X/80 (Date: -- ) Interpretation of Score: 20 questions each scored on a 5 point scale with 0 representing \"extreme difficulty or unable to perform\" and 4 representing \"no difficulty\". The lower the score, the greater the functional disability. 80/80 represents no disability. Minimal detectable change is 9 points. Medical Necessity:  
· Patient demonstrates fair rehab potential due to higher previous functional level. Reason for Services/Other Comments: 
· Patient continues to require skilled intervention due to need to return to higher level of function. Use of outcome tool(s) and clinical judgement create a POC that gives a: Questionable prediction of patient's progress: MODERATE COMPLEXITY  
  
 
 
 
TREATMENT:  
(In addition to Assessment/Re-Assessment sessions the following treatments were rendered) Pre-treatment Symptoms/Complaints:  Pt states she had some swelling after flying but not as much as previous. Pain: Initial:  
    Post Session:  Not rated Aquatic Therapy (45 minutes): Aquatic treatment performed per flow grid for Decreased muscle strength, Decreased range of motion, Decreased activity endurance, Decompression and Ease of movement. Cues provided for posture. Aquatic Exercise Log Date 
12/20/18 Date 
12/26 Date 1/3/19 Date 1/16/19 Date Activity/ Exercise Parameters Parameters Parameters Parameters Parameters Walking forward 6 6 6 6 Walking backward 6 6 6 6 Walking sideways 6 6 6 6 Marching 6 6 6 6 Goose Step 6 6 6 6 Tip toes 3 3 3 3 Heels 3 3 3 3 Lunges Long step 6 Long step 6 Long step 6 Long step 6 Side step squats LE Exercises 4.5' no resistance 4.5 4# 3. 5' with purple noodle 4.5 4# Hip Flex/Ext 10 10 15 15 Hip Abd/Add 10 10 15 15 Hip IR/ER Calf raises 10 10 15 15 Knee Flex   15 15 Squats Leg Circles 10/10 10/10 15/15 15/15 Step Ups 10 B 10 15 15 UE Exercises Squeeze In Push Down Pull Down Bicep/Tricep Rows/Press outs  Chi Positions Trunk Rotation Deep H2O/ Noodles 7' no resistance blue rings 7' with 4# 7' with no resistance 7' with 4# and noodle Stabilization Arms only Legs only Bicycle 3 min 2 min 3 min 3 min [employer] 2 min 2 min 3 min 3 min Scissors 2 min 2 min 3 min 1 min Crab walk Lower abdominal  
work  DKTC 2 min DKTC 2 min DKTC 3 min Cardio Jogging Lap Swimming Stretches Hamstrings Heelcords Piriformis Neck Treatment/Session Assessment:   
· Response to Treatment:  Pt states she is doing pretty well. She is planning to join the [facility] when she is discharged from therapy. · Compliance with Program/Exercises: Compliant all of the time. · Recommendations/Intent for next treatment session: \"Next visit will focus on  aquatic PT. \". Total Treatment Duration:  38 minutes PT Patient Time In/Time Out Time In: 36 Time Out: 6396 Reyes Chen, PT

## 2019-01-23 ENCOUNTER — HOSPITAL ENCOUNTER (OUTPATIENT)
Dept: PHYSICAL THERAPY | Age: 39
Discharge: HOME | End: 2019-01-23
Payer: COMMERCIAL

## 2019-01-23 PROCEDURE — 97022 WHIRLPOOL THERAPY: CPT

## 2019-01-23 PROCEDURE — 97113 AQUATIC THERAPY/EXERCISES: CPT

## 2019-01-23 NOTE — PROGRESS NOTES
[address] : 1980 Primary: [address] Secondary:  Therapy Center at [address] [place] [address] [address] [facility], [address] [place] [address] [address], 4950 [facility] [phone]   [phone] OUTPATIENT PHYSICAL THERAPY:Daily Note and Aquatic Note 2019 ICD-10: Treatment Diagnosis: Pain in left knee (M25.562) Precautions/Allergies: allergic to PCN and sulfa MD Orders: Eval and treat, ROM, strengthening, HEP 2x week for 6 weeks. MEDICAL/REFERRING DIAGNOSIS: 
L knee DATE OF ONSET: about 2 years ago REFERRING PHYSICIAN: Wendy Botello MD 
RETURN PHYSICIAN APPOINTMENT: 19 INITIAL ASSESSMENT:  Ms. Heraclio Emanuel is a 43year old female with complaints of L knee pain over the past couple of years. She presents with pain during some activities, decreased flexibility in quads and hip flexors, and radiographic evidence of arthritis in her L knee. She could benefit from PT to address deficits and work toward goals. PROBLEM LIST (Impacting functional limitations): 1. Decreased ADL/Functional Activities 2. Increased Pain 3. Decreased Flexibility/Joint Mobility INTERVENTIONS PLANNED: (Treatment may consist of any combination of the following) 1. Home Exercise Program (HEP) 2. Manual Therapy 3. Therapeutic Exercise/Strengthening 4. aquatic PT  
5. Modals as needed TREATMENT PLAN: 
Effective Dates: 2018 TO 3/18/2019 (90 days). Frequency/Duration: 2 times a week for 90 Day(s) GOALS: (Goals have been discussed and agreed upon with patient.) Patient's stated goal is to be able to exercise again Short-Term Functional Goals: Time Frame: 6 weeks 1. Patient to dinesh independent with HEP 2. Patient to tolerate 45 minute aquatic PT sessions 3. Patient to report no pain in L knee greater than 6/10 for 3 days in a row. Discharge Goals: Time Frame: 90 days 1. Patient to report no more than minimal L knee pain with usual daily activities 2. Patient to report being able to resume walking for exercising 3. Patient to improve LEFS score to 60 to demo improved functional mobility Rehabilitation Potential For Stated Goals: Fair The information in this section was collected on 12/18/18 (except where otherwise noted). HISTORY:  
History of Present Injury/Illness (Reason for Referral): 
*patient reports that about 2 years ago, she hyper extended her L knee while putting a child in his car seat. She rested and iced off and on and the knee seemed to get better at times, but would get worse again. She finally went to family MD and was referred to Dr. Verner Redwood. An MRI showed \"An [de-identified]year old knee\" and she was told that surgery was not an option. She reports that the knee often gómez underneath her. Past Medical History/Comorbidities: Concussion, stress fracture, ulcerative colitis. Social History/Living Environment:  
   Lives in 2 story home with family. Her daughter shadows Dr. Verner Redwood. Prior Level of Function/Work/Activity: 
Middle . Likes to exercise to lose weight but knee pain prevents it. Ambulatory/Rehab Services H2 Model Falls Risk Assessment Risk Factors: 
     No Risk Factors Identified Ability to Rise from Chair: 
     (0)  Ability to rise in a single movement Falls Prevention Plan: No modifications necessary Total: (5 or greater = High Risk): 0  
 ©2010 [facility] of [employer] [address] [place] [address] Patent #[id number]. Federal Law prohibits the replication, distribution or use without written permission from [facility] of "[employer] Current Medications:  Lialda, Omega 3, probiotic   Date Last Reviewed:  1/28/2019 Number of Personal Factors/Comorbidities that affect the Plan of Care: 0: LOW COMPLEXITY EXAMINATION:  
Observation/Orthostatic Postural Assessment:   
      Moderately overweight female in no acute distress. B genuvalgum. Girth measurements at superior an inferior patellar poles R 43.5/35 cm,  L 41.5/33.5 cm Palpation: No pain with palpation ROM:   
      AROM R knee flex 130, extn 1 
      AROM L knee flex 128, extn 3 Tight in B hip flexors and quads Strength:   
      Grossly 5/5 in B LEs via MMT, Special Tests:   
      Positive McMurrays on L LE. Ambiguous grind test on B LEs Neurological Screen: 
      Sensation: light touch intact in B LEs Body Structures Involved: 1. Joints 2. Muscles Body Functions Affected: 1. Neuromusculoskeletal 
2. Movement Related Activities and Participation Affected: 1. General Tasks and Demands 2. Mobility 3. Community, Social and [place] Number of elements (examined above) that affect the Plan of Care: 1-2: LOW COMPLEXITY CLINICAL PRESENTATION:  
Presentation: Evolving clinical presentation with changing clinical characteristics: MODERATE COMPLEXITY CLINICAL DECISION MAKING:  
Outcome Measure: Tool Used: Lower Extremity Functional Scale (LEFS) Score:  Initial: 44/80 Most Recent: X/80 (Date: -- ) Interpretation of Score: 20 questions each scored on a 5 point scale with 0 representing \"extreme difficulty or unable to perform\" and 4 representing \"no difficulty\". The lower the score, the greater the functional disability. 80/80 represents no disability. Minimal detectable change is 9 points. Medical Necessity:  
· Patient demonstrates fair rehab potential due to higher previous functional level. Reason for Services/Other Comments: 
· Patient continues to require skilled intervention due to need to return to higher level of function. Use of outcome tool(s) and clinical judgement create a POC that gives a: Questionable prediction of patient's progress: MODERATE COMPLEXITY  
  
 
 
 
TREATMENT:  
(In addition to Assessment/Re-Assessment sessions the following treatments were rendered) Pre-treatment Symptoms/Complaints:  Pt states she is having less pain but is still unable to go up stairs using her L first 
Pain: Initial: 4/10 Post Session:  Not rated Aquatic Therapy (45 minutes): Aquatic treatment performed per flow grid for Decreased muscle strength, Decreased range of motion, Decreased activity endurance, Decompression and Ease of movement. Cues provided for posture. Aquatic Exercise Log Date 
12/20/18 Date 
12/26 Date 1/3/19 Date 1/16/19 Date 1/23/19 Activity/ Exercise Parameters Parameters Parameters Parameters Parameters Walking forward 6 6 6 6 6 Walking backward 6 6 6 6 6 Walking sideways 6 6 6 6 6 Marching 6 6 6 6 6 Goose Step 6 6 6 6 6 Tip toes 3 3 3 3 3 Heels 3 3 3 3 3 Lunges Long step 6 Long step 6 Long step 6 Long step 6 Long step 6 Side step squats LE Exercises 4.5' no resistance 4.5 4# 3. 5' with purple noodle 4.5 4# 4.5# Hip Flex/Ext 10 10 15 15 15 Hip Abd/Add 10 10 15 15 15 Hip IR/ER Calf raises 10 10 15 15 15 Knee Flex   15 15 15 Squats Leg Circles 10/10 10/10 15/15 15/15 15/15 Step Ups 10 B 10 15 15 15 UE Exercises Squeeze In Push Down Pull Down Bicep/Tricep Rows/Press outs  Chi Positions Trunk Rotation Deep H2O/ Noodles 7' no resistance blue rings 7' with 4# 7' with no resistance 7' with 4# and noodle 7' with 4# and noodle Stabilization Arms only Legs only Bicycle 3 min 2 min 3 min 3 min 3 min [place] 2 min 2 min 3 min 3 min 3 min Scissors 2 min 2 min 3 min 1 min 3 min Crab walk Lower abdominal  
work  DKTC 2 min DKTC 2 min DKTC 3 min Cardio Jogging Lap Swimming Stretches Hamstrings Heelcords Piriformis Neck Treatment/Session Assessment: · Response to Treatment:  Pt states she is doing better but still has problems with steps. · Compliance with Program/Exercises: Compliant all of the time. · Recommendations/Intent for next treatment session: \"Next visit will focus on  aquatic PT. \". Total Treatment Duration:  38 minutes PT Patient Time In/Time Out Time In: 36 Time Out: 0406 Crzuito Kaiser, PT

## 2019-01-30 ENCOUNTER — APPOINTMENT (OUTPATIENT)
Dept: PHYSICAL THERAPY | Age: 39
End: 2019-01-30
Payer: COMMERCIAL

## 2019-02-06 ENCOUNTER — APPOINTMENT (OUTPATIENT)
Dept: PHYSICAL THERAPY | Age: 39
End: 2019-02-06

## 2019-02-13 ENCOUNTER — APPOINTMENT (OUTPATIENT)
Dept: PHYSICAL THERAPY | Age: 39
End: 2019-02-13

## 2019-02-20 ENCOUNTER — APPOINTMENT (OUTPATIENT)
Dept: PHYSICAL THERAPY | Age: 39
End: 2019-02-20

## 2019-03-19 NOTE — THERAPY DISCHARGE
[address] : 1980 Primary: [address] Secondary:  Therapy Center at [facility] ROXANNE FRIED 1101 [facility], [address] [place] [address],[address], [place] [address]. [phone]   [phone] OUTPATIENT PHYSICAL THERAPY:Discontinuation Summary 3/19/2019 ICD-10: Treatment Diagnosis: Pain in left knee (M25.562) Precautions/Allergies: allergic to PCN and sulfa MD Orders: Eval and treat, ROM, strengthening, HEP 2x week for 6 weeks. Patient attended 7 PT sessions from 18 to 19 with 2 missed sessions, then missed last scheduled session on 19 MEDICAL/REFERRING DIAGNOSIS: 
L knee DATE OF ONSET: about 2 years ago REFERRING PHYSICIAN: Roberth Lundberg., MD 
RETURN PHYSICIAN APPOINTMENT: ???  
 
ASSESSMENT:  Ms. Oumou De Jesus is a 43year old female with complaints of L knee pain over the past couple of years. She presented with pain during some activities, decreased flexibility in quads and hip flexors, and radiographic evidence of arthritis in her L knee. She stopped attending PT after 19 and will now be discontinued from PT. TREATMENT PLAN:  Discontinue PT Unable to assess progress toward goals. GOALS: (Goals have been discussed and agreed upon with patient.) Patient's stated goal is to be able to exercise again Short-Term Functional Goals: Time Frame: 6 weeks 1. Patient to dinesh independent with HEP 2. Patient to tolerate 45 minute aquatic PT sessions 3. Patient to report no pain in L knee greater than 6/10 for 3 days in a row. Discharge Goals: Time Frame: 90 days 1. Patient to report no more than minimal L knee pain with usual daily activities 2. Patient to report being able to resume walking for exercising 3. Patient to improve LEFS score to 60 to demo improved functional mobility Rehabilitation Potential For Stated Goals: Fair The information in this section was collected on 18 (except where otherwise noted).  
HISTORY:  
 History of Present Injury/Illness (Reason for Referral): 
*patient reports that about 2 years ago, she hyper extended her L knee while putting a child in his car seat. She rested and iced off and on and the knee seemed to get better at times, but would get worse again. She finally went to family MD and was referred to Dr. Otis Manley. An MRI showed \"An [de-identified]year old knee\" and she was told that surgery was not an option. She reports that the knee often gómez underneath her. Past Medical History/Comorbidities: Concussion, stress fracture, ulcerative colitis. Social History/Living Environment:  
   Lives in 2 [place] home with family. Her daughter shadows Dr. Otis Manley. Prior Level of Function/Work/Activity: 
Middle . Likes to exercise to lose weight but knee pain prevents it. Ambulatory/Rehab Services H2 Model Falls Risk Assessment Total: (5 or greater = High Risk): 0  
 ©2010 [facility]. [facility] [employer] Patent #[id number]. Federal Law prohibits the replication, distribution or use without written permission from [facility] of [employer] Current Medications:  Lialda, Omega 3, probiotic   Date Last Reviewed:  3/19/2019 EXAMINATION:  
Observation/Orthostatic Postural Assessment:   
      Moderately overweight female in no acute distress. B genuvalgum. Girth measurements at superior an inferior patellar poles R 43.5/35 cm,  L 41.5/33.5 cm Palpation: No pain with palpation ROM:   
      AROM R knee flex 130, extn 1 
      AROM L knee flex 128, extn 3 Tight in B hip flexors and quads Strength:   
      Grossly 5/5 in B LEs via MMT, Special Tests:   
      Positive McMurrays on L LE. Ambiguous grind test on B LEs Neurological Screen: 
      Sensation: light touch intact in B LEs Body Structures Involved: 1. Joints 2. Muscles Body Functions Affected: 1. Neuromusculoskeletal 
2. Movement Related Activities and Participation Affected: 1. General Tasks and Demands 2. Mobility 3. Community, Social and [place] CLINICAL DECISION MAKING:  
Outcome Measure: Tool Used: Lower Extremity Functional Scale (LEFS) Score:  Initial: 44/80 Most Recent: X/80 (Date: -- ) Interpretation of Score: 20 questions each scored on a 5 point scale with 0 representing \"extreme difficulty or unable to perform\" and 4 representing \"no difficulty\". The lower the score, the greater the functional disability. 80/80 represents no disability. Minimal detectable change is 9 points.

## 2025-03-14 ENCOUNTER — TELEPHONE (OUTPATIENT)
Dept: PRIMARY CARE CLINIC | Facility: CLINIC | Age: 45
End: 2025-03-14

## 2025-03-14 NOTE — TELEPHONE ENCOUNTER
----- Message from Terrie VILA sent at 3/14/2025 10:22 AM EDT -----  Regarding: [facility] Appointment Request  [facility] Appointment Request    Patient needs appointment for [facility] Appointment Type: New Patient.    Patient Requested Dates(s): Any date  Patient Requested Time: Any time   Provider Name: Janki Aldana DO    Reason for Appointment Request: New Patient - Requested Provider unavailable  --------------------------------------------------------------------------------------------------------------------------    Relationship to Patient: Self     Call Back Information: OK to leave message on voicemail  Preferred Call Back Number: Phone - [phone]    Patient wants to establish care with as a new patient